# Patient Record
Sex: MALE | URBAN - METROPOLITAN AREA
[De-identification: names, ages, dates, MRNs, and addresses within clinical notes are randomized per-mention and may not be internally consistent; named-entity substitution may affect disease eponyms.]

---

## 2021-01-29 ENCOUNTER — APPOINTMENT (OUTPATIENT)
Age: 56
Setting detail: DERMATOLOGY
End: 2021-01-29

## 2021-01-29 DIAGNOSIS — L57.8 OTHER SKIN CHANGES DUE TO CHRONIC EXPOSURE TO NONIONIZING RADIATION: ICD-10-CM

## 2021-01-29 PROBLEM — C44.311 BASAL CELL CARCINOMA OF SKIN OF NOSE: Status: ACTIVE | Noted: 2021-01-29

## 2021-01-29 PROCEDURE — 99202 OFFICE O/P NEW SF 15 MIN: CPT | Mod: 25

## 2021-01-29 PROCEDURE — OTHER MIPS QUALITY: OTHER

## 2021-01-29 PROCEDURE — 13152 CMPLX RPR E/N/E/L 2.6-7.5 CM: CPT

## 2021-01-29 PROCEDURE — OTHER COUNSELING: OTHER

## 2021-01-29 PROCEDURE — 17311 MOHS 1 STAGE H/N/HF/G: CPT

## 2021-01-29 PROCEDURE — OTHER MOHS SURGERY: OTHER

## 2021-01-29 NOTE — PROCEDURE: MIPS QUALITY
Quality 400a: One-Time Screening For Hepatitis C Virus (Hcv) For All Patients: Patient received one-time screening for HCV infection
Quality 226: Preventive Care And Screening: Tobacco Use: Screening And Cessation Intervention: Patient screened for tobacco use and is an ex/non-smoker
Detail Level: Detailed
Quality 130: Documentation Of Current Medications In The Medical Record: Current Medications Documented
Quality 431: Preventive Care And Screening: Unhealthy Alcohol Use - Screening: Patient screened for unhealthy alcohol use using a single question and scores less than 2 times per year
Quality 110: Preventive Care And Screening: Influenza Immunization: Influenza Immunization Administered during Influenza season
Quality 265: Biopsy Follow-Up: Biopsy results reviewed, communicated, tracked, and documented
Quality 358: Patient-Centered Surgical Risk Assessment And Communication: Documentation of patient-specific risk assessment with a risk calculator based on multi-institutional clinical data, the specific risk calculator used, and communication of risk assessment from risk calculator with the patient or family.

## 2021-02-05 ENCOUNTER — APPOINTMENT (OUTPATIENT)
Age: 56
Setting detail: DERMATOLOGY
End: 2021-02-05

## 2021-02-05 DIAGNOSIS — Z48.02 ENCOUNTER FOR REMOVAL OF SUTURES: ICD-10-CM

## 2021-02-05 PROCEDURE — 99024 POSTOP FOLLOW-UP VISIT: CPT

## 2021-02-05 PROCEDURE — OTHER SUTURE REMOVAL (GLOBAL PERIOD): OTHER

## 2021-02-05 PROCEDURE — OTHER COUNSELING: OTHER

## 2021-02-05 ASSESSMENT — LOCATION DETAILED DESCRIPTION DERM: LOCATION DETAILED: NASAL DORSUM

## 2021-02-05 ASSESSMENT — LOCATION ZONE DERM: LOCATION ZONE: NOSE

## 2021-02-05 ASSESSMENT — LOCATION SIMPLE DESCRIPTION DERM: LOCATION SIMPLE: NOSE

## 2021-07-28 ENCOUNTER — APPOINTMENT (OUTPATIENT)
Age: 56
Setting detail: DERMATOLOGY
End: 2021-07-28

## 2021-07-28 PROBLEM — C44.319 BASAL CELL CARCINOMA OF SKIN OF OTHER PARTS OF FACE: Status: ACTIVE | Noted: 2021-07-28

## 2021-07-28 PROCEDURE — 13132 CMPLX RPR F/C/C/M/N/AX/G/H/F: CPT

## 2021-07-28 PROCEDURE — 17311 MOHS 1 STAGE H/N/HF/G: CPT

## 2021-07-28 PROCEDURE — OTHER COUNSELING: OTHER

## 2021-07-28 PROCEDURE — OTHER MIPS QUALITY: OTHER

## 2021-07-28 PROCEDURE — 17312 MOHS ADDL STAGE: CPT

## 2021-07-28 PROCEDURE — OTHER MOHS SURGERY: OTHER

## 2021-07-28 NOTE — PROCEDURE: MIPS QUALITY
Quality 431: Preventive Care And Screening: Unhealthy Alcohol Use - Screening: Patient screened for unhealthy alcohol use using a single question and scores less than 2 times per year
Quality 226: Preventive Care And Screening: Tobacco Use: Screening And Cessation Intervention: Patient screened for tobacco use and is an ex/non-smoker
Quality 265: Biopsy Follow-Up: Biopsy results reviewed, communicated, tracked, and documented
Quality 358: Patient-Centered Surgical Risk Assessment And Communication: Documentation of patient-specific risk assessment with a risk calculator based on multi-institutional clinical data, the specific risk calculator used, and communication of risk assessment from risk calculator with the patient or family.
Quality 130: Documentation Of Current Medications In The Medical Record: Current Medications Documented
Detail Level: Detailed
Quality 400a: One-Time Screening For Hepatitis C Virus (Hcv) For All Patients: Patient received one-time screening for HCV infection
Quality 110: Preventive Care And Screening: Influenza Immunization: Influenza Immunization Administered during Influenza season

## 2021-08-05 ENCOUNTER — APPOINTMENT (OUTPATIENT)
Age: 56
Setting detail: DERMATOLOGY
End: 2021-08-10

## 2021-08-05 DIAGNOSIS — Z48.02 ENCOUNTER FOR REMOVAL OF SUTURES: ICD-10-CM

## 2021-08-05 PROCEDURE — OTHER SUTURE REMOVAL (GLOBAL PERIOD): OTHER

## 2021-08-05 PROCEDURE — 99024 POSTOP FOLLOW-UP VISIT: CPT

## 2021-08-05 ASSESSMENT — LOCATION DETAILED DESCRIPTION DERM: LOCATION DETAILED: RIGHT SUPERIOR FOREHEAD

## 2021-08-05 ASSESSMENT — LOCATION SIMPLE DESCRIPTION DERM: LOCATION SIMPLE: RIGHT FOREHEAD

## 2021-08-05 ASSESSMENT — LOCATION ZONE DERM: LOCATION ZONE: FACE

## 2021-08-05 NOTE — PROCEDURE: SUTURE REMOVAL (GLOBAL PERIOD)
Detail Level: Simple
Add 42023 Cpt? (Important Note: In 2017 The Use Of 03003 Is Being Tracked By Cms To Determine Future Global Period Reimbursement For Global Periods): yes

## 2022-09-27 NOTE — PROCEDURE: MOHS SURGERY
fever Brow Lift Text: A midfrontal incision was made medially to the defect to allow access to the tissues just superior to the left eyebrow. Following careful dissection inferiorly in a supraperiosteal plane to the level of the left eyebrow, several 3-0 monocryl sutures were used to resuspend the eyebrow orbicularis oculi muscular unit to the superior frontal bone periosteum. This resulted in an appropriate reapproximation of static eyebrow symmetry and correction of the left brow ptosis.

## 2022-10-28 NOTE — PROCEDURE: MOHS SURGERY
-- DO NOT REPLY / DO NOT REPLY ALL --  -- Message is from [facility] ([facility]) --    ONLY TO BE USED WITHIN A REFILL MEDICATION ENCOUNTER    Med Refill  Is the patient currently having any symptoms?: No/Non-Emergent symptoms    Name of medication requested: See pended med    Has patient contacted the pharmacy? No, direct patient to contact pharmacy first as they will be able to process the refill request directly    Is this the first request for the medication in the last 48 hours?: Yes      Patient is requesting a medication refill - medication is on active list      Full name of the provider who ordered the medication:KIANNA SPANN    Clinic site name / Account # for provider:[place]     Preferred Pharmacy: Pharmacy  [facility] - [place], [place] - [address]    Patient confirmed the above pharmacy as correct?  Yes      Caller Information       Type Contact Phone/Fax    10/28/2022 12:59 PM CDT Phone (Incoming) Ronna Maher (Self) [phone] (H)          Alternative phone number: None    Can a detailed message be left?: Yes    Patient is completely out of medication: Verify if patient is currently experiencing symptoms. If patient is symptomatic, proceed with front end triage instead of medication refill. If patient is not symptomatic but is completely out of medication, jaspreet as High priority when routing. Inform patient: “Please call back with any questions or concerns and if your condition becomes life threatening, you should seek immediate medical assistance by calling 911 or going to the Emergency Department for evaluation.”    Inform all patients: \"If the clinical team needs to contact you regarding this refill, please be aware the return phone call may come from an unidentified or out of state phone number and your refill request will be addressed as soon as the clinical team reviews your message.\"   Island Pedicle Flap-Requiring Vessel Identification Text: The defect edges were debeveled with a #15 scalpel blade.  Given the location of the defect, shape of the defect and the proximity to free margins an island pedicle advancement flap was deemed most appropriate.  Using a sterile surgical marker, an appropriate advancement flap was drawn, based on the axial vessel mentioned above, incorporating the defect, outlining the appropriate donor tissue and placing the expected incisions within the relaxed skin tension lines where possible.    The area thus outlined was incised deep to adipose tissue with a #15 scalpel blade.  The skin margins were undermined to an appropriate distance in all directions around the primary defect and laterally outward around the island pedicle utilizing iris scissors.  There was minimal undermining beneath the pedicle flap.

## 2023-08-07 NOTE — PROCEDURE: MOHS SURGERY
details… Advancement Flap (Single) Text: The defect edges were debeveled with a #15 scalpel blade.  Given the location of the defect and the proximity to free margins a single advancement flap was deemed most appropriate.  Using a sterile surgical marker, an appropriate advancement flap was drawn incorporating the defect and placing the expected incisions within the relaxed skin tension lines where possible.    The area thus outlined was incised deep to adipose tissue with a #15 scalpel blade.  The skin margins were undermined to an appropriate distance in all directions utilizing iris scissors.

## 2023-08-30 NOTE — PROCEDURE: MOHS SURGERY
Asc Procedure Text (D): After obtaining clear surgical margins the patient was sent to an ASC for surgical repair.  The patient understands they will receive post-surgical care and follow-up from the ASC physician. Epidermal Autograft Text: The defect edges were debeveled with a #15 scalpel blade.  Given the location of the defect, shape of the defect and the proximity to free margins an epidermal autograft was deemed most appropriate.  Using a sterile surgical marker, the primary defect shape was transferred to the donor site. The epidermal graft was then harvested.  The skin graft was then placed in the primary defect and oriented appropriately.